# Patient Record
Sex: MALE
[De-identification: names, ages, dates, MRNs, and addresses within clinical notes are randomized per-mention and may not be internally consistent; named-entity substitution may affect disease eponyms.]

---

## 2018-02-02 ENCOUNTER — HOSPITAL ENCOUNTER (OUTPATIENT)
Dept: HOSPITAL 56 - MW.SDS | Age: 58
Discharge: HOME | End: 2018-02-02
Attending: SURGERY
Payer: COMMERCIAL

## 2018-02-02 VITALS — DIASTOLIC BLOOD PRESSURE: 91 MMHG | SYSTOLIC BLOOD PRESSURE: 141 MMHG

## 2018-02-02 DIAGNOSIS — Z79.51: ICD-10-CM

## 2018-02-02 DIAGNOSIS — Z88.5: ICD-10-CM

## 2018-02-02 DIAGNOSIS — I10: ICD-10-CM

## 2018-02-02 DIAGNOSIS — Z90.49: ICD-10-CM

## 2018-02-02 DIAGNOSIS — G47.30: ICD-10-CM

## 2018-02-02 DIAGNOSIS — Z79.899: ICD-10-CM

## 2018-02-02 DIAGNOSIS — Z90.89: ICD-10-CM

## 2018-02-02 DIAGNOSIS — K57.30: Primary | ICD-10-CM

## 2018-02-02 DIAGNOSIS — K51.30: ICD-10-CM

## 2018-02-02 DIAGNOSIS — E66.9: ICD-10-CM

## 2018-02-02 DIAGNOSIS — J45.909: ICD-10-CM

## 2018-02-02 DIAGNOSIS — Z99.89: ICD-10-CM

## 2018-02-02 PROCEDURE — 45380 COLONOSCOPY AND BIOPSY: CPT

## 2018-02-02 PROCEDURE — 88305 TISSUE EXAM BY PATHOLOGIST: CPT

## 2018-02-02 NOTE — PCM.OPNOTE
- General Post-Op/Procedure Note


Date of Surgery/Procedure: 02/02/18


Operative Procedure(s): Colonoscopy with rectosigmoid biopsies


Pre Op Diagnosis: intermittent rectal bleeding


Post-Op Diagnosis: Proctocolitis.  Sigmoid diverticulosis.


Anesthesia Technique: MAC (ASA III)


Primary Surgeon: Ramiro Ashton


Condition: Good


Free Text/Narrative:: 


 Intake & Output











 02/01/18 02/02/18 02/02/18





 19:59 03:59 11:59


 


Intake Total   900


 


Balance   900








Dictation [id number]





CPT CODE 01328

## 2018-02-02 NOTE — PCM.PREANE
Preanesthetic Assessment





- Anesthesia/Transfusion/Family Hx


Anesthesia History: Prior Anesthesia Without Reaction


Transfusion History: No Prior Transfusion(s)





- Review of Systems


General: No Symptoms


Pulmonary: No Symptoms


Cardiovascular: No Symptoms


Gastrointestinal: No Symptoms


Neurological: No Symptoms


Other: Reports: None





- Physical Assessment


NPO Status Date: 02/01/18


NPO Status Time: 23:00


Height: 6 ft


Weight: 135.624 kg


ASA Class: 3


Mental Status: Alert & Oriented x3


Airway Class: Mallampati = 2


Dentition: Reports: Normal Dentition


Thyro-Mental Finger Breadths: 3


Mouth Opening Finger Breadths: 3


ROM/Head Extension: Full


Lungs: Clear to Auscultation, Normal Respiratory Effort


Cardiovascular: Regular Rate, Regular Rhythm





- Allergies


Allergies/Adverse Reactions: 


 Allergies











Allergy/AdvReac Type Severity Reaction Status Date / Time


 


codeine Allergy  Nausea Verified 01/30/18 08:46














- Anesthesia Plan


Free Text/Narrative:: 


Patient states he has been feeling good, uses his inhaler 2-4 times per week.  

His lung sounds are clear this AM, but he feels a little short of breath and 

will use his inhaler prior to his procedure.





- Acknowledgements


Anesthesia Type Planned: MAC


Pt an Appropriate Candidate for the Planned Anesthesia: Yes


Alternatives and Risks of Anesthesia Discussed w Pt/Guardian: Yes


Pt/Guardian Understands and Agrees with Anesthesia Plan: Yes





PreAnesthesia Questionnaire


HEENT History: Reports: Other (See Below)


Other HEENT History: wears glasses


Cardiovascular History: Reports: Hypertension


Respiratory History: Reports: Asthma, Sleep Apnea, Other (See Below) (Hx of 

pneumonia in the past)


Other Respiratory History: uses CPAP


Gastrointestinal History: Reports: None


Genitourinary History: Reports: None


Musculoskeletal History: Reports: Back Pain, Chronic, Fracture, Other (See Below

)


Other Musculoskeletal History: compression fx of L3 vertebra, hx fx shoulder 

blade


Neurological History: Reports: None


Psychiatric History: Reports: None


Endocrine/Metabolic History: Reports: Obesity/BMI 30+


Hematologic History: Reports: None


Immunologic History: Reports: None


Oncologic (Cancer) History: Reports: None


Dermatologic History: Reports: None





- Past Surgical History


Head Surgeries/Procedures: Reports: None


HEENT Surgical History: Reports: Naso-Sinus Surgery, Tonsillectomy


Cardiovascular Surgical History: Reports: Other (See Below)


Other Cardiovascular Surgeries/Procedures: hx varicose vein ligation


GI Surgical History: Reports: Appendectomy, Cholecystectomy, Hernia Repair/Other





- SUBSTANCE USE


Smoking Status *Q: Never Smoker


Recreational Drug Use History: No





- HOME MEDS


Home Medications: 


 Home Meds





Fluticasone Propionate [Flonase Allergy Relief] 1 spray NASBOTH DAILY 12/19/17 [

History]


Losartan/Hydrochlorothiazide [Losartan-HCTZ 50-12.5 MG] 1 tab PO DAILY 12/19/17 

[History]


Multivitamin [Multivitamins] 1 tab PO DAILY 12/19/17 [History]


Albuterol [Proair HFA] 2 puff INH ASDIRECTED PRN 01/30/18 [History]











- CURRENT (IN HOUSE) MEDS


Current Meds: 





 Current Medications





Lactated Ringer's (Ringers, Lactated)  1,000 mls @ 125 mls/hr IV ASDIRECTED YESSI





Discontinued Medications





Lidocaine (Xylocaine-Mpf 2%) Confirm Administered Dose 5 ml .ROUTE .STK-MED ONE


   Stop: 02/02/18 07:31


Propofol (Diprivan  20 Ml) Confirm Administered Dose 400 mg .ROUTE .STK-MED ONE


   Stop: 02/02/18 07:31

## 2018-02-02 NOTE — OR
SURGEON:

Ramiro Ashton M.D.

 

DATE OF PROCEDURE:  02/02/2018

 

OPERATION PERFORMED:

Colonoscopy with biopsies of the rectosigmoid.

 

ANESTHESIA:

MAC.

 

ASA CLASSIFICATION:

3.

 

PREOPERATIVE DIAGNOSIS:

Intermittent rectal bleeding.

 

POSTOPERATIVE DIAGNOSES:

1. Nonspecific proctocolitis.

2. Mild sigmoid diverticulosis.

 

DESCRIPTION OF PROCEDURE:

The patient was taken to the endoscopy room, positioned on the endoscopy table

in the left lateral decubitus position.  Time-out was called for appropriate

identification of patient and procedure.  Monitored anesthesia care was

provided.  The colonoscope was inserted into the rectum and advanced with

minimal difficulty to the cecum where the colonoscope was retroflexed to

visualize the ascending colon from below.  The colonoscope was then straightened

and slowly withdrawn.  The cecum, ascending colon, hepatic flexure, transverse

colon, splenic flexure, and descending colon showed no tumors, polyps,

diverticula, or angiodysplastic changes.  A few scattered sigmoid diverticula

were noted.  The area in the rectosigmoid junction showed acute inflammatory

changes.  Biopsies of this area were obtained and sent for histologic analysis.

The colonoscope was further withdrawn to the rectum and retroflexed to visualize

the anal orifice from above.  No tumors or polyps were seen and there were no

acute hemorrhoidal changes.  The colonoscope was then straightened, the rectum

aspirated, and the colonoscope removed.

 

The patient tolerated the procedure well and was taken to recovery room in

stable condition.

 

 

ERIC MARTINEZ

DD:  02/02/2018 10:12:09

DT:  02/02/2018 16:36:31

Job #:  [id number]